# Patient Record
Sex: MALE | Race: WHITE | NOT HISPANIC OR LATINO
[De-identification: names, ages, dates, MRNs, and addresses within clinical notes are randomized per-mention and may not be internally consistent; named-entity substitution may affect disease eponyms.]

---
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
## 2021-01-22 ENCOUNTER — APPOINTMENT (OUTPATIENT)
Dept: PEDIATRICS | Facility: CLINIC | Age: 21
End: 2021-01-22
Payer: COMMERCIAL

## 2021-01-22 PROCEDURE — 99212 OFFICE O/P EST SF 10 MIN: CPT

## 2021-01-22 PROCEDURE — 99072 ADDL SUPL MATRL&STAF TM PHE: CPT

## 2021-01-22 NOTE — HISTORY OF PRESENT ILLNESS
[de-identified] : COVID testing [FreeTextEntry6] : \par Pt returning to college in about 1 week; requires COVID test\par   No IE. Pt asymptomatic

## 2021-01-25 LAB — SARS-COV-2 N GENE NPH QL NAA+PROBE: NOT DETECTED

## 2021-11-20 ENCOUNTER — APPOINTMENT (OUTPATIENT)
Dept: PEDIATRICS | Facility: CLINIC | Age: 21
End: 2021-11-20

## 2021-11-26 ENCOUNTER — TRANSCRIPTION ENCOUNTER (OUTPATIENT)
Age: 21
End: 2021-11-26

## 2021-12-19 ENCOUNTER — NON-APPOINTMENT (OUTPATIENT)
Age: 21
End: 2021-12-19

## 2021-12-21 ENCOUNTER — TRANSCRIPTION ENCOUNTER (OUTPATIENT)
Age: 21
End: 2021-12-21

## 2021-12-28 ENCOUNTER — APPOINTMENT (OUTPATIENT)
Dept: PEDIATRICS | Facility: CLINIC | Age: 21
End: 2021-12-28
Payer: COMMERCIAL

## 2021-12-28 VITALS — BODY MASS INDEX: 25.05 KG/M2 | HEIGHT: 72.9 IN | WEIGHT: 189 LBS

## 2021-12-28 DIAGNOSIS — E73.9 LACTOSE INTOLERANCE, UNSPECIFIED: ICD-10-CM

## 2021-12-28 DIAGNOSIS — Z20.822 CONTACT WITH AND (SUSPECTED) EXPOSURE TO COVID-19: ICD-10-CM

## 2021-12-28 DIAGNOSIS — Z23 ENCOUNTER FOR IMMUNIZATION: ICD-10-CM

## 2021-12-28 PROCEDURE — 90715 TDAP VACCINE 7 YRS/> IM: CPT

## 2021-12-28 PROCEDURE — 99173 VISUAL ACUITY SCREEN: CPT

## 2021-12-28 PROCEDURE — 90471 IMMUNIZATION ADMIN: CPT

## 2021-12-28 PROCEDURE — 99395 PREV VISIT EST AGE 18-39: CPT | Mod: 25

## 2021-12-28 RX ORDER — MONTELUKAST 10 MG/1
10 TABLET, FILM COATED ORAL DAILY
Qty: 30 | Refills: 2 | Status: ACTIVE | COMMUNITY
Start: 2019-06-18 | End: 1900-01-01

## 2021-12-29 PROBLEM — E73.9 LACTOSE INTOLERANCE: Status: RESOLVED | Noted: 2019-08-23 | Resolved: 2021-12-29

## 2021-12-29 PROBLEM — Z20.822 ENCOUNTER FOR SCREENING LABORATORY TESTING FOR COVID-19 VIRUS IN ASYMPTOMATIC PATIENT: Status: RESOLVED | Noted: 2021-01-22 | Resolved: 2021-12-29

## 2021-12-29 RX ORDER — LISDEXAMFETAMINE DIMESYLATE 50 MG/1
50 CAPSULE ORAL
Refills: 0 | Status: DISCONTINUED | COMMUNITY
End: 2021-12-29

## 2021-12-29 RX ORDER — LISDEXAMFETAMINE DIMESYLATE 30 MG/1
30 CAPSULE ORAL
Refills: 0 | Status: ACTIVE | COMMUNITY

## 2021-12-29 NOTE — DISCUSSION/SUMMARY
[] : The components of the vaccine(s) to be administered today are listed in the plan of care. The disease(s) for which the vaccine(s) are intended to prevent and the risks have been discussed with the caretaker.  The risks are also included in the appropriate vaccination information statements which have been provided to the patient's caregiver.  The caregiver has given consent to vaccinate. [FreeTextEntry1] : \par labs '19

## 2021-12-29 NOTE — HISTORY OF PRESENT ILLNESS
[Eats regular meals including adequate fruits and vegetables] : eats regular meals including adequate fruits and vegetables [At least 1 hour of physical activity a day] : at least 1 hour of physical activity a day [Drinks alcohol] : drinks alcohol [Yes] : Patient has had sexual intercourse. 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 Flonase seasonally\par  No new issues\par -LI better\par -saw derm re: skin cyst- no tx rec

## 2022-01-09 ENCOUNTER — TRANSCRIPTION ENCOUNTER (OUTPATIENT)
Age: 22
End: 2022-01-09

## 2022-01-21 ENCOUNTER — TRANSCRIPTION ENCOUNTER (OUTPATIENT)
Age: 22
End: 2022-01-21

## 2022-05-17 ENCOUNTER — NON-APPOINTMENT (OUTPATIENT)
Age: 22
End: 2022-05-17

## 2022-07-14 ENCOUNTER — APPOINTMENT (OUTPATIENT)
Dept: PEDIATRICS | Facility: CLINIC | Age: 22
End: 2022-07-14

## 2022-07-14 VITALS — SYSTOLIC BLOOD PRESSURE: 122 MMHG | DIASTOLIC BLOOD PRESSURE: 75 MMHG | HEART RATE: 77 BPM

## 2022-07-14 VITALS — WEIGHT: 194 LBS | BODY MASS INDEX: 25.71 KG/M2 | HEIGHT: 73 IN

## 2022-07-14 DIAGNOSIS — Z00.00 ENCOUNTER FOR GENERAL ADULT MEDICAL EXAMINATION W/OUT ABNORMAL FINDINGS: ICD-10-CM

## 2022-07-14 DIAGNOSIS — U07.1 COVID-19: ICD-10-CM

## 2022-07-14 DIAGNOSIS — L72.0 EPIDERMAL CYST: ICD-10-CM

## 2022-07-14 PROCEDURE — 99173 VISUAL ACUITY SCREEN: CPT

## 2022-07-14 PROCEDURE — 99395 PREV VISIT EST AGE 18-39: CPT

## 2022-07-14 NOTE — RISK ASSESSMENT
[0] : 1) Little interest or pleasure doing things: Not at all (0) [1] : 2) Feeling down, depressed, or hopeless for several days (1) [[id number]] : 1

## 2022-07-14 NOTE — HISTORY OF PRESENT ILLNESS
[Up to date] : Up to date [Sleep Concerns] : no sleep concerns [Has concerns about body or appearance] : does not have concerns about body or appearance [At least 1 hour of physical activity a day] : at least 1 hour of physical activity a day [Uses electronic nicotine delivery system] : does not use electronic nicotine delivery system [Uses tobacco] : does not use tobacco [Uses drugs] : does not use drugs  [Drinks alcohol] : drinks alcohol [Yes] : Patient has had sexual intercourse. [Gets depressed, anxious, or irritable/has mood swings] : does not get depressed, anxious, or irritable/has mood swings [With Teen] : teen [de-identified] : self [de-identified] : attends [employer] [de-identified] : current single female partner [FreeTextEntry1] : \par -plams to have bx of cyst on back\par -h/o food allergy/RAD\par   On singulair qd. Still follows with allergist\par -h/o ADHD\par   med: VYvanse 30 qd (takes prn with exams)\par -s/p mild COVID in May

## 2022-07-14 NOTE — PHYSICAL EXAM

## 2022-10-15 ENCOUNTER — APPOINTMENT (OUTPATIENT)
Dept: PEDIATRICS | Facility: CLINIC | Age: 22
End: 2022-10-15

## 2022-10-15 PROCEDURE — 90686 IIV4 VACC NO PRSV 0.5 ML IM: CPT

## 2022-10-15 PROCEDURE — 90471 IMMUNIZATION ADMIN: CPT

## 2023-01-18 ENCOUNTER — RESULT CHARGE (OUTPATIENT)
Age: 23
End: 2023-01-18

## 2023-01-18 ENCOUNTER — APPOINTMENT (OUTPATIENT)
Dept: PEDIATRICS | Facility: CLINIC | Age: 23
End: 2023-01-18
Payer: COMMERCIAL

## 2023-01-18 DIAGNOSIS — J02.9 ACUTE PHARYNGITIS, UNSPECIFIED: ICD-10-CM

## 2023-01-18 LAB — S PYO AG SPEC QL IA: NEGATIVE

## 2023-01-18 PROCEDURE — 99072 ADDL SUPL MATRL&STAF TM PHE: CPT

## 2023-01-18 PROCEDURE — 99213 OFFICE O/P EST LOW 20 MIN: CPT

## 2023-01-18 PROCEDURE — 87880 STREP A ASSAY W/OPTIC: CPT | Mod: QW

## 2023-01-18 NOTE — DISCUSSION/SUMMARY
[FreeTextEntry1] : Anticipatory guidance and parent education given.\par History and physical consistent with pharyngitis.\par Rapid strep test negative in office, throat culture sent to lab.\par Will follow up by phone if throat culture results are positive.\par Advise supportive care. Tylenol or Motrin as needed for pain or fever. Increase fluids, rest.\par Follow up if symptoms persist or worsen.\par

## 2023-01-18 NOTE — REVIEW OF SYSTEMS
[Headache] : no headache [Eye Discharge] : no eye discharge [Eye Redness] : no eye redness [Ear Pain] : no ear pain [Nasal Discharge] : no nasal discharge [Nasal Congestion] : nasal congestion [Sinus Pressure] : no sinus pressure [Sore Throat] : sore throat [Enlarged Lymph Nodes] : no enlarged lymph nodes [Tender Lymph Nodes] : non tender  lymph nodes [Dysuria] : no dysuria [Negative] : Skin

## 2023-01-18 NOTE — PHYSICAL EXAM
[Erythematous Oropharynx] : erythematous oropharynx [Moves All Extremities x 4] : moves all extremities x4 [NL] : warm, clear [de-identified] : scant exudate [de-identified] : shotty anterior cervical LAD

## 2023-01-18 NOTE — HISTORY OF PRESENT ILLNESS
[de-identified] : sore throat [FreeTextEntry6] : 22 year old young man presents with c/o worsening sore throat over the past 3 days. Also with some nasal congestion and achiness. S/P chills x1. No fever. No cough or difficulty breathing. No rash. No v/d. No known sick contacts. Pt is sexually active with one monogamous female partner. Good po/uop/bm. Covid negative at home this afternoon.

## 2023-06-29 ENCOUNTER — APPOINTMENT (OUTPATIENT)
Dept: PEDIATRICS | Facility: CLINIC | Age: 23
End: 2023-06-29

## 2023-07-29 ENCOUNTER — NON-APPOINTMENT (OUTPATIENT)
Age: 23
End: 2023-07-29

## 2023-08-03 ENCOUNTER — APPOINTMENT (OUTPATIENT)
Dept: PEDIATRICS | Facility: CLINIC | Age: 23
End: 2023-08-03
Payer: COMMERCIAL

## 2023-08-03 VITALS — TEMPERATURE: 97.9 F | WEIGHT: 199 LBS

## 2023-08-03 DIAGNOSIS — N45.1 EPIDIDYMITIS: ICD-10-CM

## 2023-08-03 PROCEDURE — 99213 OFFICE O/P EST LOW 20 MIN: CPT

## 2023-08-04 PROBLEM — N45.1 EPIDIDYMITIS: Status: ACTIVE | Noted: 2023-08-04

## 2023-08-04 NOTE — PLAN
The patient is a 70y Female complaining of muscle pain. [TextEntry] : Sympt tx disc. Phone f/u 3d; consider U/S and/or uro referral if not resolved

## 2023-08-04 NOTE — PHYSICAL EXAM
[NL] : soft, nontender, nondistended, normal bowel sounds, no hepatosplenomegaly [FreeTextEntry6] : testes descended. Appear nl; no swelling, masses. Pt c/o mild pain with palp of left epididymis

## 2023-08-04 NOTE — HISTORY OF PRESENT ILLNESS
[de-identified] : urg care f/u [FreeTextEntry6] :  Pt with onset left testicular pain 6d ago. Was seen at [facility] care 2d later. Dx: epididymitis   pt given rocephin IM and Rx po doxy Pt cont to have pain but is improving. + SA. No fever, dysuria

## 2023-08-06 ENCOUNTER — NON-APPOINTMENT (OUTPATIENT)
Age: 23
End: 2023-08-06

## 2023-09-07 ENCOUNTER — APPOINTMENT (OUTPATIENT)
Dept: PEDIATRICS | Facility: CLINIC | Age: 23
End: 2023-09-07

## 2023-11-24 ENCOUNTER — NON-APPOINTMENT (OUTPATIENT)
Age: 23
End: 2023-11-24

## 2023-12-15 ENCOUNTER — APPOINTMENT (OUTPATIENT)
Dept: PEDIATRICS | Facility: CLINIC | Age: 23
End: 2023-12-15
Payer: COMMERCIAL

## 2023-12-15 VITALS — TEMPERATURE: 98.1 F | WEIGHT: 201 LBS

## 2023-12-15 DIAGNOSIS — R19.5 OTHER FECAL ABNORMALITIES: ICD-10-CM

## 2023-12-15 DIAGNOSIS — R68.89 OTHER GENERAL SYMPTOMS AND SIGNS: ICD-10-CM

## 2023-12-15 LAB — FLUAV SPEC QL CULT: NORMAL

## 2023-12-15 PROCEDURE — 99213 OFFICE O/P EST LOW 20 MIN: CPT

## 2023-12-15 PROCEDURE — 87804 INFLUENZA ASSAY W/OPTIC: CPT | Mod: 59,QW

## 2023-12-15 NOTE — HISTORY OF PRESENT ILLNESS
[FreeTextEntry6] : Pt presents complaining of colorless, pale, beige stool of different consistency than usual.  Pts symptoms started on Sunday about 5 days ago had Nausea and vomiting for 48 hours. On Tuesday he developed body aches, soreness, diarrhea stayed in bed most of the day. Wednesday, he ate a piece of chocolate not knowing it had nuts in it. He has a history of anaphylaxis to nuts. Pt took his EpiPen and called an ambulance. Pt received Benadryl & dexamethasone, was at [facility]. Was sent home with two days of prednisone. At the time pt had lip and tongue swelling, later developed facial edema and hives, which have all subsided.  Thursday developed pale stool, colorless and strange texture. Today patient had another BM that was pale almost beige in color and a slightly more normal consistency as per pt. Pt states he feels much better from earlier in the week. Now concerned about stool. Denies abdominal pain, fever, N/V.

## 2023-12-15 NOTE — PHYSICAL EXAM
[Erythematous Oropharynx] : nonerythematous oropharynx [Clear to Auscultation Bilaterally] : clear to auscultation bilaterally [Regular Rate and Rhythm] : regular rate and rhythm [Soft] : soft [Tender] : nontender [Distended] : nondistended [No Abnormal Lymph Nodes Palpated] : no abnormal lymph nodes palpated [Moves All Extremities x 4] : moves all extremities x4 [NL] : warm, clear [Warm] : warm [Clear] : clear [Dry] : dry

## 2023-12-15 NOTE — DISCUSSION/SUMMARY
[FreeTextEntry1] : Rapid Flu: Negative. LMOM informing patient.  Stool studies ordered to lab. Pt aware to go to [facility] lab.  Referred to GI, encouraged to call and make an appointment for ASAP to get in for an evaluation.  Informed that if fever, N/V, pain etc develop to proceed to ER.  Pt verbalizes understanding to all the above.  Pt also provided with information for adult PCP & adult GI.  All questions answered, reassurance provided. Instructed to follow up as needed with any questions, concerns or worsening symptoms.

## 2023-12-18 ENCOUNTER — NON-APPOINTMENT (OUTPATIENT)
Age: 23
End: 2023-12-18

## 2023-12-18 LAB
GI PCR PANEL: DETECTED
NOROVIRUS GI/GII: DETECTED

## 2023-12-22 LAB — DEPRECATED O AND P PREP STL: NORMAL

## 2025-06-23 ENCOUNTER — EMERGENCY (EMERGENCY)
Facility: HOSPITAL | Age: 25
LOS: 1 days | End: 2025-06-23
Attending: EMERGENCY MEDICINE | Admitting: EMERGENCY MEDICINE
Payer: COMMERCIAL

## 2025-06-23 VITALS
DIASTOLIC BLOOD PRESSURE: 78 MMHG | RESPIRATION RATE: 16 BRPM | OXYGEN SATURATION: 99 % | TEMPERATURE: 99 F | SYSTOLIC BLOOD PRESSURE: 122 MMHG | HEART RATE: 80 BPM

## 2025-06-23 VITALS
WEIGHT: 175.05 LBS | HEART RATE: 89 BPM | RESPIRATION RATE: 18 BRPM | DIASTOLIC BLOOD PRESSURE: 82 MMHG | TEMPERATURE: 99 F | HEIGHT: 74 IN | OXYGEN SATURATION: 100 % | SYSTOLIC BLOOD PRESSURE: 150 MMHG

## 2025-06-23 RX ORDER — PREDNISONE 20 MG/1
1 TABLET ORAL
Qty: 3 | Refills: 0
Start: 2025-06-23 | End: 2025-06-25

## 2025-06-23 RX ORDER — PREDNISONE 20 MG/1
60 TABLET ORAL ONCE
Refills: 0 | Status: COMPLETED | OUTPATIENT
Start: 2025-06-23 | End: 2025-06-23

## 2025-06-23 RX ORDER — LEVOCETIRIZINE DIHYDROCHLORIDE 5 MG/1
1 TABLET ORAL
Qty: 10 | Refills: 0
Start: 2025-06-23

## 2025-06-23 RX ORDER — DIPHENHYDRAMINE HCL 12.5MG/5ML
50 ELIXIR ORAL ONCE
Refills: 0 | Status: COMPLETED | OUTPATIENT
Start: 2025-06-23 | End: 2025-06-23

## 2025-06-23 RX ORDER — DIPHENHYDRAMINE HCL 12.5MG/5ML
50 ELIXIR ORAL ONCE
Refills: 0 | Status: DISCONTINUED | OUTPATIENT
Start: 2025-06-23 | End: 2025-06-23

## 2025-06-23 RX ADMIN — PREDNISONE 60 MILLIGRAM(S): 20 TABLET ORAL at 21:11

## 2025-06-23 RX ADMIN — Medication 40 MILLIGRAM(S): at 21:11

## 2025-06-23 RX ADMIN — Medication 0.3 MILLIGRAM(S): at 22:35

## 2025-06-23 RX ADMIN — Medication 50 MILLIGRAM(S): at 21:11

## 2025-06-23 NOTE — ED PROVIDER NOTE - OBJECTIVE STATEMENT
24 yo M with PMHx of asthma, no h/o intubation in the past, severe nut allergies, presenting c/o allergic reaction 24 yo M with PMHx of asthma, no h/o intubation in the past, severe nut allergies, presenting c/o allergic reaction. Pt reports having some food that was contaminated with pine nuts at a restaurant, felt mild facial itching, flushing and swelling, self administered epi at 20:20 with improvement in sx, Now with minimal itching sensation. Denies fever, chills, SOB, CP, palpitations, wheezing, stridors, change in phonating, tongue/lip swelling, focal weakness, HA, dizziness, N/V/D/C, oral/genital lesions, dysphagia, cough, paresthesia, numbness, tingling, malaise, and diaphoresis.  No new products/meds/food reported.

## 2025-06-23 NOTE — ED ADULT TRIAGE NOTE - CHIEF COMPLAINT QUOTE
BIBA [facility]. Pt states he had some food that had nuts in it appx 1945. Pt reports feeling a tickle in his throat so he administered his epi pen appx 2020. Pt denies CP, SOB, NV, of further at triage.

## 2025-06-23 NOTE — ED PROVIDER NOTE - NS ED ATTENDING STATEMENT MOD
I have seen and examined this patient and fully participated in the care of this patient as the teaching attending.  The service was shared with the KEL.  I reviewed and verified the documentation.

## 2025-06-23 NOTE — ED PROVIDER NOTE - PHYSICAL EXAMINATION
Gen - WDWN, NAD, comfortable and non-toxic appearing, speaking in full sentences, phonating well   Skin - warm, dry, intact, minimal flushing around the L cheek, no fluctuance, desquamation of the skin, vesicles, or urticaria noted   HEENT - AT/NC, PERRL, pupils symmetric b/l, MMM, no nasal discharge, airway patent, neck supple and FROM  CV - S1S2, R/R/R  Resp - CTAB, no r/r/w  GI - soft, ND, NT, no CVAT b/l, no palpable pulsatile or bulging mass   MS - W/W/P, no C/C/E, No acute or gross deformities noted to extremities. No midline spinal tenderness or step off on palpation  Neuro - AxOx3, no focal neuro deficits, ambulatory without gait disturbance

## 2025-06-23 NOTE — ED PROVIDER NOTE - CLINICAL SUMMARY MEDICAL DECISION MAKING FREE TEXT BOX
26 yo M with PMHx of asthma, no h/o intubation in the past, severe nut allergies, presenting c/o allergic reaction. Pt reports having some food that was contaminated with pine nuts at a restaurant, felt mild facial itching, flushing and swelling, self administered epi at 20:20 with improvement in sx, Now with minimal itching sensation.   - no airway involvement or other relapsing sx on exam,   - given additional po H1/2 blockers, and steroids   - monitored in the ED with stable VS and no relapsing sx 26 yo M with PMHx of asthma, no h/o intubation in the past, severe nut allergies, presenting c/o allergic reaction. Pt reports having some food that was contaminated with pine nuts at a restaurant, felt mild facial itching, flushing and swelling, self administered epi at 20:20 with improvement in sx, Now with minimal itching sensation.   - no airway involvement or other relapsing sx on exam,   - given additional po H1/2 blockers, and steroids   - monitored in the ED with stable VS and no relapsing sx, will dc home on course of antihistamine and steroids, f/u with PMD/allergist, pt verbalized understanding

## 2025-06-23 NOTE — ED PROVIDER NOTE - PROGRESS NOTE DETAILS
Pt reassessed at bedside with no new or relapse of sx noted. Resting comfortably with no acute distress noted. AFVSS, tolerating po without N/V, no relapsing or worsening rash. Will continue with current management and monitoring no relapse of sx, airway patent, no urticaria, desires to go home, AFVSS, phonating well, given epi pen to go as well

## 2025-06-23 NOTE — ED ADULT TRIAGE NOTE - PATIENT'S PREFERRED PRONOUN
CLINICAL PHARMACY NOTE: MEDS TO [address] [place] [address] Drive Select Patient?: No  Total # of Prescriptions Filled: 2   The following medications were delivered to the patient:  · Iron Supplement for children solution  · Clindamycin palmitate suspension  Total # of Interventions Completed: 0  Time Spent (min): 30    Additional Documentation: Him/He

## 2025-06-23 NOTE — ED ADULT TRIAGE NOTE - GLASGOW COMA SCALE: BEST VERBAL RESPONSE, MLM
PHYSICAL EXAM:    GENERAL: Alert, appears stated age, well appearing, non-toxic speaking in complete sentences.   SKIN: Warm, pink and dry. MMM.   EYE: Normal lids/conjunctiva  ENT: Normal hearing, patent oropharynx   NECK: +supple. No meningismus  Pulm: Bilateral BS, normal resp effort, no wheezes, stridor, or retractions  CV: RRR, no M/R/G, 2+and = radial pulses  Abd: soft, non-tender, non-distended  Mskel: no erythema, cyanosis, edema. no calf tenderness  Neuro: AAOx3, 4/5 strength to RLE which pt says is baseline, otherwise 5/5 strength throughout. (V5) oriented

## 2025-06-23 NOTE — ED ADULT NURSE NOTE - OBJECTIVE STATEMENT
Pt presents to ED c/o allergic reaction. Pt states he was mistakenly given pine nuts at a restaurant, ran out and immediately administered his epi pen to himself due to hx of severe allergic reactions. Pt endorses feeling better than before. States only mild itching to throat. No difficulty breathing or throat swelling complaints. Pt noted with mild redness to face and trunk.

## 2025-06-23 NOTE — ED PROVIDER NOTE - PATIENT PORTAL LINK FT
You can access the FollowMyHealth Patient Portal offered by [facility] by registering at the following website: [email or web address]. By joining [facility]’s FollowMyHealth portal, you will also be able to view your health information using other applications (apps) compatible with our system.

## 2025-06-23 NOTE — ED PROVIDER NOTE - CARE PROVIDER_API CALL
Arminda West  Allergy and Immunology  [address] [place] [address]  Phone: [phone]  Fax: [phone]  Follow Up Time:

## 2025-06-25 DIAGNOSIS — T78.1XXA OTHER ADVERSE FOOD REACTIONS, NOT ELSEWHERE CLASSIFIED, INITIAL ENCOUNTER: ICD-10-CM

## 2025-06-25 DIAGNOSIS — Z91.018 ALLERGY TO OTHER FOODS: ICD-10-CM

## 2025-06-25 DIAGNOSIS — L29.9 PRURITUS, UNSPECIFIED: ICD-10-CM

## 2025-06-25 DIAGNOSIS — R22.0 LOCALIZED SWELLING, MASS AND LUMP, HEAD: ICD-10-CM

## 2025-06-25 DIAGNOSIS — J45.909 UNSPECIFIED ASTHMA, UNCOMPLICATED: ICD-10-CM

## 2025-08-04 ENCOUNTER — NON-APPOINTMENT (OUTPATIENT)
Age: 25
End: 2025-08-04

## 2025-08-06 ENCOUNTER — EMERGENCY (EMERGENCY)
Facility: HOSPITAL | Age: 25
LOS: 1 days | End: 2025-08-06
Attending: STUDENT IN AN ORGANIZED HEALTH CARE EDUCATION/TRAINING PROGRAM | Admitting: STUDENT IN AN ORGANIZED HEALTH CARE EDUCATION/TRAINING PROGRAM
Payer: COMMERCIAL

## 2025-08-06 VITALS
HEIGHT: 74 IN | DIASTOLIC BLOOD PRESSURE: 94 MMHG | TEMPERATURE: 100 F | HEART RATE: 94 BPM | RESPIRATION RATE: 18 BRPM | WEIGHT: 179.9 LBS | SYSTOLIC BLOOD PRESSURE: 154 MMHG | OXYGEN SATURATION: 100 %

## 2025-08-06 DIAGNOSIS — R53.83 OTHER FATIGUE: ICD-10-CM

## 2025-08-06 DIAGNOSIS — R42 DIZZINESS AND GIDDINESS: ICD-10-CM

## 2025-08-06 DIAGNOSIS — M79.10 MYALGIA, UNSPECIFIED SITE: ICD-10-CM

## 2025-08-06 DIAGNOSIS — Z91.018 ALLERGY TO OTHER FOODS: ICD-10-CM

## 2025-08-06 DIAGNOSIS — R59.0 LOCALIZED ENLARGED LYMPH NODES: ICD-10-CM

## 2025-08-06 LAB
ANION GAP SERPL CALC-SCNC: 11 MMOL/L — SIGNIFICANT CHANGE UP (ref 5–17)
BUN SERPL-MCNC: 21 MG/DL — SIGNIFICANT CHANGE UP (ref 7–23)
CALCIUM SERPL-MCNC: 10 MG/DL — SIGNIFICANT CHANGE UP (ref 8.4–10.5)
CHLORIDE SERPL-SCNC: 98 MMOL/L — SIGNIFICANT CHANGE UP (ref 96–108)
CK SERPL-CCNC: 148 U/L — SIGNIFICANT CHANGE UP (ref 30–200)
CO2 SERPL-SCNC: 26 MMOL/L — SIGNIFICANT CHANGE UP (ref 22–31)
CREAT SERPL-MCNC: 1.02 MG/DL — SIGNIFICANT CHANGE UP (ref 0.5–1.3)
EGFR: 105 ML/MIN/1.73M2 — SIGNIFICANT CHANGE UP
EGFR: 105 ML/MIN/1.73M2 — SIGNIFICANT CHANGE UP
GLUCOSE SERPL-MCNC: 109 MG/DL — HIGH (ref 70–99)
HCT VFR BLD CALC: 39.7 % — SIGNIFICANT CHANGE UP (ref 39–50)
HGB BLD-MCNC: 13.8 G/DL — SIGNIFICANT CHANGE UP (ref 13–17)
HIV 1+2 AB+HIV1 P24 AG SERPL QL IA: SIGNIFICANT CHANGE UP
MCHC RBC-ENTMCNC: 30.9 PG — SIGNIFICANT CHANGE UP (ref 27–34)
MCHC RBC-ENTMCNC: 34.8 G/DL — SIGNIFICANT CHANGE UP (ref 32–36)
MCV RBC AUTO: 89 FL — SIGNIFICANT CHANGE UP (ref 80–100)
NRBC # BLD AUTO: 0 K/UL — SIGNIFICANT CHANGE UP (ref 0–0)
NRBC # FLD: 0 K/UL — SIGNIFICANT CHANGE UP (ref 0–0)
NRBC BLD AUTO-RTO: 0 /100 WBCS — SIGNIFICANT CHANGE UP (ref 0–0)
PLATELET # BLD AUTO: 123 K/UL — LOW (ref 150–400)
PMV BLD: 10.5 FL — SIGNIFICANT CHANGE UP (ref 7–13)
POTASSIUM SERPL-MCNC: 4 MMOL/L — SIGNIFICANT CHANGE UP (ref 3.5–5.3)
POTASSIUM SERPL-SCNC: 4 MMOL/L — SIGNIFICANT CHANGE UP (ref 3.5–5.3)
RBC # BLD: 4.46 M/UL — SIGNIFICANT CHANGE UP (ref 4.2–5.8)
RBC # FLD: 12 % — SIGNIFICANT CHANGE UP (ref 10.3–14.5)
SODIUM SERPL-SCNC: 135 MMOL/L — SIGNIFICANT CHANGE UP (ref 135–145)
WBC # BLD: 3.64 K/UL — LOW (ref 3.8–10.5)
WBC # FLD AUTO: 3.64 K/UL — LOW (ref 3.8–10.5)

## 2025-08-06 PROCEDURE — 96374 THER/PROPH/DIAG INJ IV PUSH: CPT

## 2025-08-06 PROCEDURE — 99284 EMERGENCY DEPT VISIT MOD MDM: CPT

## 2025-08-06 PROCEDURE — 36415 COLL VENOUS BLD VENIPUNCTURE: CPT

## 2025-08-06 PROCEDURE — 87389 HIV-1 AG W/HIV-1&-2 AB AG IA: CPT

## 2025-08-06 PROCEDURE — 85027 COMPLETE CBC AUTOMATED: CPT

## 2025-08-06 PROCEDURE — 82550 ASSAY OF CK (CPK): CPT

## 2025-08-06 PROCEDURE — 80048 BASIC METABOLIC PNL TOTAL CA: CPT

## 2025-08-06 PROCEDURE — 99284 EMERGENCY DEPT VISIT MOD MDM: CPT | Mod: 25

## 2025-08-06 RX ORDER — KETOROLAC TROMETHAMINE 30 MG/ML
15 INJECTION, SOLUTION INTRAMUSCULAR; INTRAVENOUS ONCE
Refills: 0 | Status: DISCONTINUED | OUTPATIENT
Start: 2025-08-06 | End: 2025-08-06

## 2025-08-06 RX ADMIN — KETOROLAC TROMETHAMINE 15 MILLIGRAM(S): 30 INJECTION, SOLUTION INTRAMUSCULAR; INTRAVENOUS at 22:28

## 2025-08-06 RX ADMIN — Medication 1000 MILLILITER(S): at 22:28

## 2025-08-23 ENCOUNTER — EMERGENCY (EMERGENCY)
Facility: HOSPITAL | Age: 25
LOS: 0 days | Discharge: HOME | End: 2025-08-23
Attending: EMERGENCY MEDICINE
Payer: COMMERCIAL

## 2025-08-23 VITALS
RESPIRATION RATE: 16 BRPM | SYSTOLIC BLOOD PRESSURE: 103 MMHG | DIASTOLIC BLOOD PRESSURE: 60 MMHG | TEMPERATURE: 98 F | HEART RATE: 66 BPM | OXYGEN SATURATION: 100 %

## 2025-08-23 VITALS
SYSTOLIC BLOOD PRESSURE: 128 MMHG | WEIGHT: 193.57 LBS | TEMPERATURE: 99 F | DIASTOLIC BLOOD PRESSURE: 66 MMHG | OXYGEN SATURATION: 100 % | HEART RATE: 78 BPM | RESPIRATION RATE: 18 BRPM

## 2025-08-23 DIAGNOSIS — N43.3 HYDROCELE, UNSPECIFIED: ICD-10-CM

## 2025-08-23 DIAGNOSIS — S30.22XA CONTUSION OF SCROTUM AND TESTES, INITIAL ENCOUNTER: ICD-10-CM
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
## 2025-08-23 DIAGNOSIS — Z91.018 ALLERGY TO OTHER FOODS: ICD-10-CM
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
## 2025-08-23 LAB
ALBUMIN SERPL ELPH-MCNC: 4 G/DL — SIGNIFICANT CHANGE UP (ref 3.3–5)
ALP SERPL-CCNC: 95 U/L — SIGNIFICANT CHANGE UP (ref 40–120)
ALT FLD-CCNC: 97 U/L — HIGH (ref 12–78)
ANION GAP SERPL CALC-SCNC: 5 MMOL/L — SIGNIFICANT CHANGE UP (ref 5–17)
APPEARANCE UR: CLEAR — SIGNIFICANT CHANGE UP
AST SERPL-CCNC: 34 U/L — SIGNIFICANT CHANGE UP (ref 15–37)
BASOPHILS # BLD AUTO: 0.04 K/UL — SIGNIFICANT CHANGE UP (ref 0–0.2)
BASOPHILS # BLD MANUAL: 0 K/UL — SIGNIFICANT CHANGE UP (ref 0–0.2)
BASOPHILS NFR BLD AUTO: 0.8 % — SIGNIFICANT CHANGE UP (ref 0–2)
BASOPHILS NFR BLD MANUAL: 0 % — SIGNIFICANT CHANGE UP (ref 0–2)
BILIRUB SERPL-MCNC: 0.6 MG/DL — SIGNIFICANT CHANGE UP (ref 0.2–1.2)
BILIRUB UR-MCNC: NEGATIVE — SIGNIFICANT CHANGE UP
BUN SERPL-MCNC: 19 MG/DL — SIGNIFICANT CHANGE UP (ref 7–23)
CALCIUM SERPL-MCNC: 9 MG/DL — SIGNIFICANT CHANGE UP (ref 8.5–10.1)
CHLORIDE SERPL-SCNC: 107 MMOL/L — SIGNIFICANT CHANGE UP (ref 96–108)
CO2 SERPL-SCNC: 26 MMOL/L — SIGNIFICANT CHANGE UP (ref 22–31)
COLOR SPEC: YELLOW — SIGNIFICANT CHANGE UP
CREAT SERPL-MCNC: 0.98 MG/DL — SIGNIFICANT CHANGE UP (ref 0.5–1.3)
DIFF PNL FLD: NEGATIVE — SIGNIFICANT CHANGE UP
EGFR: 110 ML/MIN/1.73M2 — SIGNIFICANT CHANGE UP
EGFR: 110 ML/MIN/1.73M2 — SIGNIFICANT CHANGE UP
EOSINOPHIL # BLD AUTO: 0.07 K/UL — SIGNIFICANT CHANGE UP (ref 0–0.5)
EOSINOPHIL # BLD MANUAL: 0 K/UL — SIGNIFICANT CHANGE UP (ref 0–0.5)
EOSINOPHIL NFR BLD AUTO: 1.3 % — SIGNIFICANT CHANGE UP (ref 0–6)
EOSINOPHIL NFR BLD MANUAL: 0 % — SIGNIFICANT CHANGE UP (ref 0–6)
GLUCOSE SERPL-MCNC: 97 MG/DL — SIGNIFICANT CHANGE UP (ref 70–99)
GLUCOSE UR QL: NEGATIVE MG/DL — SIGNIFICANT CHANGE UP
HCT VFR BLD CALC: 37.9 % — LOW (ref 39–50)
HGB BLD-MCNC: 12.9 G/DL — LOW (ref 13–17)
IMM GRANULOCYTES # BLD AUTO: 0.01 K/UL — SIGNIFICANT CHANGE UP (ref 0–0.07)
IMM GRANULOCYTES NFR BLD AUTO: 0.2 % — SIGNIFICANT CHANGE UP (ref 0–0.9)
KETONES UR QL: NEGATIVE MG/DL — SIGNIFICANT CHANGE UP
LEUKOCYTE ESTERASE UR-ACNC: NEGATIVE — SIGNIFICANT CHANGE UP
LYMPHOCYTES # BLD AUTO: 2.66 K/UL — SIGNIFICANT CHANGE UP (ref 1–3.3)
LYMPHOCYTES # BLD MANUAL: 2.03 K/UL — SIGNIFICANT CHANGE UP (ref 1–3.3)
LYMPHOCYTES NFR BLD AUTO: 49.9 % — HIGH (ref 13–44)
LYMPHOCYTES NFR BLD MANUAL: 38 % — SIGNIFICANT CHANGE UP (ref 13–44)
MANUAL REACTIVE LYMPHOCYTES #: 0.43 K/UL — SIGNIFICANT CHANGE UP (ref 0–0.63)
MANUAL SMEAR VERIFICATION: SIGNIFICANT CHANGE UP
MCHC RBC-ENTMCNC: 29.9 PG — SIGNIFICANT CHANGE UP (ref 27–34)
MCHC RBC-ENTMCNC: 34 G/DL — SIGNIFICANT CHANGE UP (ref 32–36)
MCV RBC AUTO: 87.9 FL — SIGNIFICANT CHANGE UP (ref 80–100)
MONOCYTES # BLD AUTO: 0.43 K/UL — SIGNIFICANT CHANGE UP (ref 0–0.9)
MONOCYTES # BLD MANUAL: 0.43 K/UL — SIGNIFICANT CHANGE UP (ref 0–0.9)
MONOCYTES NFR BLD AUTO: 8.1 % — SIGNIFICANT CHANGE UP (ref 2–14)
MONOCYTES NFR BLD MANUAL: 8 % — SIGNIFICANT CHANGE UP (ref 2–14)
NEUTROPHILS # BLD AUTO: 2.12 K/UL — SIGNIFICANT CHANGE UP (ref 1.8–7.4)
NEUTROPHILS # BLD MANUAL: 2.45 K/UL — SIGNIFICANT CHANGE UP (ref 1.8–7.4)
NEUTROPHILS NFR BLD AUTO: 39.7 % — LOW (ref 43–77)
NEUTROPHILS NFR BLD MANUAL: 46 % — SIGNIFICANT CHANGE UP (ref 43–77)
NITRITE UR-MCNC: NEGATIVE — SIGNIFICANT CHANGE UP
NRBC # BLD AUTO: 0 K/UL — SIGNIFICANT CHANGE UP (ref 0–0)
NRBC # FLD: 0 K/UL — SIGNIFICANT CHANGE UP (ref 0–0)
NRBC BLD AUTO-RTO: 0 /100 WBCS — SIGNIFICANT CHANGE UP (ref 0–0)
PH UR: 6 — SIGNIFICANT CHANGE UP (ref 5–8)
PLAT MORPH BLD: NORMAL — SIGNIFICANT CHANGE UP
PLATELET # BLD AUTO: 186 K/UL — SIGNIFICANT CHANGE UP (ref 150–400)
PMV BLD: 9.8 FL — SIGNIFICANT CHANGE UP (ref 7–13)
POTASSIUM SERPL-MCNC: 3.8 MMOL/L — SIGNIFICANT CHANGE UP (ref 3.5–5.3)
POTASSIUM SERPL-SCNC: 3.8 MMOL/L — SIGNIFICANT CHANGE UP (ref 3.5–5.3)
PROT SERPL-MCNC: 7.4 GM/DL — SIGNIFICANT CHANGE UP (ref 6–8.3)
PROT UR-MCNC: NEGATIVE MG/DL — SIGNIFICANT CHANGE UP
RBC # BLD: 4.31 M/UL — SIGNIFICANT CHANGE UP (ref 4.2–5.8)
RBC # FLD: 12.1 % — SIGNIFICANT CHANGE UP (ref 10.3–14.5)
RBC BLD AUTO: NORMAL — SIGNIFICANT CHANGE UP
SODIUM SERPL-SCNC: 138 MMOL/L — SIGNIFICANT CHANGE UP (ref 135–145)
SP GR SPEC: 1.02 — SIGNIFICANT CHANGE UP (ref 1–1.03)
UROBILINOGEN FLD QL: 1 MG/DL — SIGNIFICANT CHANGE UP (ref 0.2–1)
VARIANT LYMPHS # BLD: 8 % — HIGH (ref 0–6)
VARIANT LYMPHS NFR BLD MANUAL: 8 % — HIGH (ref 0–6)
WBC # BLD: 5.33 K/UL — SIGNIFICANT CHANGE UP (ref 3.8–10.5)
WBC # FLD AUTO: 5.33 K/UL — SIGNIFICANT CHANGE UP (ref 3.8–10.5)
WBC MORPHOLOGY: ABNORMAL

## 2025-08-23 PROCEDURE — 85025 COMPLETE CBC W/AUTO DIFF WBC: CPT

## 2025-08-23 PROCEDURE — 80053 COMPREHEN METABOLIC PANEL: CPT

## 2025-08-23 PROCEDURE — 76870 US EXAM SCROTUM: CPT | Mod: 26

## 2025-08-23 PROCEDURE — 81003 URINALYSIS AUTO W/O SCOPE: CPT

## 2025-08-23 PROCEDURE — 36415 COLL VENOUS BLD VENIPUNCTURE: CPT

## 2025-08-23 PROCEDURE — 76870 US EXAM SCROTUM: CPT

## 2025-08-23 PROCEDURE — 99284 EMERGENCY DEPT VISIT MOD MDM: CPT | Mod: 25

## 2025-08-23 PROCEDURE — 99284 EMERGENCY DEPT VISIT MOD MDM: CPT

## 2025-09-20 ENCOUNTER — NON-APPOINTMENT (OUTPATIENT)
Age: 25
End: 2025-09-20